# Patient Record
Sex: FEMALE | HISPANIC OR LATINO | URBAN - METROPOLITAN AREA
[De-identification: names, ages, dates, MRNs, and addresses within clinical notes are randomized per-mention and may not be internally consistent; named-entity substitution may affect disease eponyms.]

---

## 2017-04-07 ENCOUNTER — APPOINTMENT (RX ONLY)
Dept: URBAN - METROPOLITAN AREA CLINIC 7 | Facility: CLINIC | Age: 72
Setting detail: DERMATOLOGY
End: 2017-04-07

## 2017-04-07 DIAGNOSIS — L57.8 OTHER SKIN CHANGES DUE TO CHRONIC EXPOSURE TO NONIONIZING RADIATION: ICD-10-CM

## 2017-04-07 DIAGNOSIS — Z71.89 OTHER SPECIFIED COUNSELING: ICD-10-CM

## 2017-04-07 DIAGNOSIS — L70.8 OTHER ACNE: ICD-10-CM

## 2017-04-07 DIAGNOSIS — E70.29 OTHER DISORDERS OF TYROSINE METABOLISM: ICD-10-CM

## 2017-04-07 DIAGNOSIS — D18.0 HEMANGIOMA: ICD-10-CM

## 2017-04-07 PROBLEM — L81.9 DISORDER OF PIGMENTATION, UNSPECIFIED: Status: ACTIVE | Noted: 2017-04-07

## 2017-04-07 PROBLEM — D18.01 HEMANGIOMA OF SKIN AND SUBCUTANEOUS TISSUE: Status: ACTIVE | Noted: 2017-04-07

## 2017-04-07 PROCEDURE — ? COUNSELING

## 2017-04-07 PROCEDURE — 99202 OFFICE O/P NEW SF 15 MIN: CPT | Mod: 25

## 2017-04-07 PROCEDURE — 11100: CPT

## 2017-04-07 PROCEDURE — ? BIOPSY BY SHAVE METHOD

## 2017-04-07 ASSESSMENT — LOCATION DETAILED DESCRIPTION DERM
LOCATION DETAILED: LEFT MEDIAL MALAR CHEEK
LOCATION DETAILED: RIGHT MEDIAL MALAR CHEEK
LOCATION DETAILED: LEFT ANTERIOR EARLOBE
LOCATION DETAILED: RIGHT INFERIOR FOREHEAD
LOCATION DETAILED: RIGHT CENTRAL MALAR CHEEK
LOCATION DETAILED: LEFT CENTRAL MALAR CHEEK
LOCATION DETAILED: RIGHT ANTERIOR EARLOBE
LOCATION DETAILED: LEFT SUPERIOR CENTRAL BUCCAL CHEEK

## 2017-04-07 ASSESSMENT — LOCATION SIMPLE DESCRIPTION DERM
LOCATION SIMPLE: RIGHT EAR
LOCATION SIMPLE: LEFT EAR
LOCATION SIMPLE: RIGHT FOREHEAD
LOCATION SIMPLE: RIGHT CHEEK
LOCATION SIMPLE: LEFT CHEEK

## 2017-04-07 ASSESSMENT — LOCATION ZONE DERM
LOCATION ZONE: EAR
LOCATION ZONE: FACE

## 2017-04-07 NOTE — HPI: SKIN LESION
How Severe Is Your Skin Lesion?: moderate
Has Your Skin Lesion Been Treated?: not been treated
Is This A New Presentation, Or A Follow-Up?: Skin Lesion
Additional History: Patient saw a PCP and was given samples of halog

## 2017-04-07 NOTE — PROCEDURE: BIOPSY BY SHAVE METHOD
Cryotherapy Text: The wound bed was treated with cryotherapy after the biopsy was performed.
Wound Care: Bacitracin
Body Location Override (Optional - Billing Will Still Be Based On Selected Body Map Location If Applicable): Right earlobe
Biopsy Method: Dermablade
Anesthesia Type: 1% lidocaine with epinephrine
Destruction After The Procedure: No
Size Of Lesion In Cm: 0
Type Of Destruction Used: Curettage
Dressing: bandage
Biopsy Type: H and E
Electrodesiccation And Curettage Text: The wound bed was treated with electrodesiccation and curettage after the biopsy was performed.
Billing Type: Third-Party Bill
Hemostasis: Drysol
Anesthesia Volume In Cc: 0.5
Notification Instructions: Patient will be notified of biopsy results. However, patient instructed to call the office if not contacted within 2 weeks.
Lab: [id number]
Post-Care Instructions: I reviewed with the patient in detail post-care instructions. Patient is to keep the biopsy site dry overnight, and then apply bacitracin twice daily until healed. Patient may apply hydrogen peroxide soaks to remove any crusting.
Electrodesiccation Text: The wound bed was treated with electrodesiccation after the biopsy was performed.
Lab Facility: [id number]
Curettage Text: The wound bed was treated with curettage after the biopsy was performed.
Detail Level: Detailed
Silver Nitrate Text: The wound bed was treated with silver nitrate after the biopsy was performed.
Consent: Written consent was obtained and risks were reviewed including but not limited to scarring, infection, bleeding, scabbing, incomplete removal, nerve damage and allergy to anesthesia.

## 2024-10-07 ENCOUNTER — APPOINTMENT (RX ONLY)
Dept: URBAN - METROPOLITAN AREA CLINIC 86 | Facility: CLINIC | Age: 79
Setting detail: DERMATOLOGY
End: 2024-10-07

## 2024-10-07 VITALS — HEIGHT: 64 IN | WEIGHT: 113 LBS

## 2024-10-08 ENCOUNTER — APPOINTMENT (RX ONLY)
Dept: URBAN - METROPOLITAN AREA CLINIC 86 | Facility: CLINIC | Age: 79
Setting detail: DERMATOLOGY
End: 2024-10-08

## 2024-10-08 DIAGNOSIS — L72.0 EPIDERMAL CYST: ICD-10-CM | Status: RESOLVING

## 2024-10-08 DIAGNOSIS — L85.3 XEROSIS CUTIS: ICD-10-CM

## 2024-10-08 PROCEDURE — 99213 OFFICE O/P EST LOW 20 MIN: CPT

## 2024-10-08 PROCEDURE — ? PRESCRIPTION

## 2024-10-08 PROCEDURE — ? TREATMENT REGIMEN

## 2024-10-08 PROCEDURE — ? PRESCRIPTION MEDICATION MANAGEMENT

## 2024-10-08 PROCEDURE — ? COUNSELING

## 2024-10-08 RX ORDER — MUPIROCIN 20 MG/G
OINTMENT TOPICAL BID
Qty: 15 | Refills: 2 | Status: ERX | COMMUNITY
Start: 2024-10-08

## 2024-10-08 RX ADMIN — MUPIROCIN: 20 OINTMENT TOPICAL at 00:00

## 2024-10-08 ASSESSMENT — LOCATION DETAILED DESCRIPTION DERM
LOCATION DETAILED: RIGHT PROXIMAL DORSAL FOREARM
LOCATION DETAILED: LEFT SUPERIOR MEDIAL UPPER BACK
LOCATION DETAILED: LEFT PROXIMAL DORSAL FOREARM
LOCATION DETAILED: STERNAL NOTCH

## 2024-10-08 ASSESSMENT — LOCATION SIMPLE DESCRIPTION DERM
LOCATION SIMPLE: LEFT FOREARM
LOCATION SIMPLE: CHEST
LOCATION SIMPLE: RIGHT FOREARM
LOCATION SIMPLE: LEFT UPPER BACK

## 2024-10-08 ASSESSMENT — LOCATION ZONE DERM
LOCATION ZONE: TRUNK
LOCATION ZONE: ARM

## 2024-10-08 NOTE — PROCEDURE: PRESCRIPTION MEDICATION MANAGEMENT
Render In Strict Bullet Format?: No
Detail Level: Simple
Initiate Treatment: Mupirocin ointment Apply bid to wound until healed